# Patient Record
Sex: FEMALE | Race: WHITE | Employment: FULL TIME | URBAN - METROPOLITAN AREA
[De-identification: names, ages, dates, MRNs, and addresses within clinical notes are randomized per-mention and may not be internally consistent; named-entity substitution may affect disease eponyms.]

---

## 2024-03-18 ENCOUNTER — OFFICE VISIT (OUTPATIENT)
Dept: ENDOCRINOLOGY CLINIC | Facility: CLINIC | Age: 59
End: 2024-03-18

## 2024-03-18 ENCOUNTER — LAB ENCOUNTER (OUTPATIENT)
Dept: LAB | Facility: HOSPITAL | Age: 59
End: 2024-03-18
Attending: INTERNAL MEDICINE
Payer: COMMERCIAL

## 2024-03-18 VITALS
HEIGHT: 66 IN | HEART RATE: 64 BPM | SYSTOLIC BLOOD PRESSURE: 140 MMHG | DIASTOLIC BLOOD PRESSURE: 87 MMHG | BODY MASS INDEX: 23.95 KG/M2 | WEIGHT: 149 LBS

## 2024-03-18 DIAGNOSIS — R53.83 FATIGUE, UNSPECIFIED TYPE: ICD-10-CM

## 2024-03-18 DIAGNOSIS — R51.9 INTRACTABLE HEADACHE, UNSPECIFIED CHRONICITY PATTERN, UNSPECIFIED HEADACHE TYPE: ICD-10-CM

## 2024-03-18 DIAGNOSIS — R53.83 FATIGUE: ICD-10-CM

## 2024-03-18 DIAGNOSIS — R68.89 COLD INTOLERANCE: ICD-10-CM

## 2024-03-18 DIAGNOSIS — E07.9 DISEASE OF THYROID GLAND: Primary | ICD-10-CM

## 2024-03-18 DIAGNOSIS — R51.9 FACIAL PAIN: ICD-10-CM

## 2024-03-18 DIAGNOSIS — E07.9 THYROID DISEASE: Primary | ICD-10-CM

## 2024-03-18 DIAGNOSIS — R68.89 MECHANICAL AND MOTOR PROBLEMS WITH INTERNAL ORGANS: ICD-10-CM

## 2024-03-18 LAB
ALBUMIN SERPL-MCNC: 4.3 G/DL (ref 3.2–4.8)
ALBUMIN/GLOB SERPL: 1.5 {RATIO} (ref 1–2)
ALP LIVER SERPL-CCNC: 50 U/L
ALT SERPL-CCNC: 13 U/L
ANION GAP SERPL CALC-SCNC: 7 MMOL/L (ref 0–18)
AST SERPL-CCNC: 14 U/L (ref ?–34)
BASOPHILS # BLD AUTO: 0.03 X10(3) UL (ref 0–0.2)
BASOPHILS NFR BLD AUTO: 0.7 %
BILIRUB SERPL-MCNC: 0.4 MG/DL (ref 0.3–1.2)
BUN BLD-MCNC: 12 MG/DL (ref 9–23)
BUN/CREAT SERPL: 12.6 (ref 10–20)
CALCIUM BLD-MCNC: 9.3 MG/DL (ref 8.7–10.4)
CHLORIDE SERPL-SCNC: 111 MMOL/L (ref 98–112)
CO2 SERPL-SCNC: 27 MMOL/L (ref 21–32)
CREAT BLD-MCNC: 0.95 MG/DL
DEPRECATED RDW RBC AUTO: 43.4 FL (ref 35.1–46.3)
EGFRCR SERPLBLD CKD-EPI 2021: 70 ML/MIN/1.73M2 (ref 60–?)
EOSINOPHIL # BLD AUTO: 0.07 X10(3) UL (ref 0–0.7)
EOSINOPHIL NFR BLD AUTO: 1.7 %
ERYTHROCYTE [DISTWIDTH] IN BLOOD BY AUTOMATED COUNT: 12.8 % (ref 11–15)
FASTING STATUS PATIENT QL REPORTED: NO
FOLATE SERPL-MCNC: 17.5 NG/ML (ref 5.4–?)
GLOBULIN PLAS-MCNC: 2.8 G/DL (ref 2.8–4.4)
GLUCOSE BLD-MCNC: 76 MG/DL (ref 70–99)
HCT VFR BLD AUTO: 41 %
HGB BLD-MCNC: 12.9 G/DL
IMM GRANULOCYTES # BLD AUTO: 0.01 X10(3) UL (ref 0–1)
IMM GRANULOCYTES NFR BLD: 0.2 %
IRON SATN MFR SERPL: 23 %
IRON SERPL-MCNC: 76 UG/DL
LYMPHOCYTES # BLD AUTO: 1.35 X10(3) UL (ref 1–4)
LYMPHOCYTES NFR BLD AUTO: 32.8 %
MCH RBC QN AUTO: 29.1 PG (ref 26–34)
MCHC RBC AUTO-ENTMCNC: 31.5 G/DL (ref 31–37)
MCV RBC AUTO: 92.3 FL
MONOCYTES # BLD AUTO: 0.28 X10(3) UL (ref 0.1–1)
MONOCYTES NFR BLD AUTO: 6.8 %
NEUTROPHILS # BLD AUTO: 2.37 X10 (3) UL (ref 1.5–7.7)
NEUTROPHILS # BLD AUTO: 2.37 X10(3) UL (ref 1.5–7.7)
NEUTROPHILS NFR BLD AUTO: 57.8 %
OSMOLALITY SERPL CALC.SUM OF ELEC: 299 MOSM/KG (ref 275–295)
PLATELET # BLD AUTO: 244 10(3)UL (ref 150–450)
POTASSIUM SERPL-SCNC: 4 MMOL/L (ref 3.5–5.1)
PROT SERPL-MCNC: 7.1 G/DL (ref 5.7–8.2)
RBC # BLD AUTO: 4.44 X10(6)UL
SODIUM SERPL-SCNC: 145 MMOL/L (ref 136–145)
T4 FREE SERPL-MCNC: 1.1 NG/DL (ref 0.8–1.7)
THYROGLOB SERPL-MCNC: 23 U/ML (ref ?–60)
THYROPEROXIDASE AB SERPL-ACNC: <28 U/ML (ref ?–60)
THYROPEROXIDASE AB SERPL-ACNC: <28 U/ML (ref ?–60)
TIBC SERPL-MCNC: 331 UG/DL (ref 250–425)
TRANSFERRIN SERPL-MCNC: 222 MG/DL (ref 250–380)
TSI SER-ACNC: 0.52 MIU/ML (ref 0.55–4.78)
VIT B12 SERPL-MCNC: 497 PG/ML (ref 211–911)
VIT D+METAB SERPL-MCNC: 25.2 NG/ML (ref 30–100)
WBC # BLD AUTO: 4.1 X10(3) UL (ref 4–11)

## 2024-03-18 PROCEDURE — 82746 ASSAY OF FOLIC ACID SERUM: CPT | Performed by: INTERNAL MEDICINE

## 2024-03-18 PROCEDURE — 84443 ASSAY THYROID STIM HORMONE: CPT | Performed by: INTERNAL MEDICINE

## 2024-03-18 PROCEDURE — 86376 MICROSOMAL ANTIBODY EACH: CPT | Performed by: INTERNAL MEDICINE

## 2024-03-18 PROCEDURE — 83540 ASSAY OF IRON: CPT | Performed by: INTERNAL MEDICINE

## 2024-03-18 PROCEDURE — 85025 COMPLETE CBC W/AUTO DIFF WBC: CPT | Performed by: INTERNAL MEDICINE

## 2024-03-18 PROCEDURE — 82607 VITAMIN B-12: CPT | Performed by: INTERNAL MEDICINE

## 2024-03-18 PROCEDURE — 84466 ASSAY OF TRANSFERRIN: CPT | Performed by: INTERNAL MEDICINE

## 2024-03-18 PROCEDURE — 36415 COLL VENOUS BLD VENIPUNCTURE: CPT | Performed by: INTERNAL MEDICINE

## 2024-03-18 PROCEDURE — 82306 VITAMIN D 25 HYDROXY: CPT | Performed by: INTERNAL MEDICINE

## 2024-03-18 PROCEDURE — 80053 COMPREHEN METABOLIC PANEL: CPT | Performed by: INTERNAL MEDICINE

## 2024-03-18 PROCEDURE — 84439 ASSAY OF FREE THYROXINE: CPT | Performed by: INTERNAL MEDICINE

## 2024-03-18 NOTE — PATIENT INSTRUCTIONS
Labs today   If labs are normal , f/u in 1 year   If labs are abnormal, schedule an appt to discuss please

## 2024-03-18 NOTE — PROGRESS NOTES
New Patient Evaluation - History and Physical    CONSULT - Reason for Visit:  thyroid disease .    Self referral  Requesting Physician:   ..No primary care provider on file.    CHIEF COMPLAINT:    Chief Complaint   Patient presents with    Consult     thyroid        HISTORY OF PRESENT ILLNESS:   Gabriela Dominguez is a 58 year old female who presents with concerns she has thyroid disease     In high school, she had large thyroid and took iodine in [place]. She cannot recall if had hyperthyroid and what she had is a treatment for that.   Now has headaches, fatigue, cannot stay asleep- no difficulty falling asleep , cold intolerance, joint pain, and  burping a lot now   2 yrs ago, had diarrhea and wt loss, had EGD and C' scope for it. She was tested and neg for celiac.   Rash under eyes from food allergy she thinks  If stays away from gluten and dairy, she feels better      Denied compression symptoms: denied except the underlined ones SOB, dysphagia, odynophagia, change in voice, hoarseness, neck pain or neck mass.     No eye or vision changes.   No heat or cold intolerance  Hair and skin: no  Menstrual hx:   Neck surgery: No  Neck radiation: No   Biotin:  no   Turmeric: no           ASSESSMENT AND PLAN:      PAST MEDICAL HISTORY:   History reviewed. No pertinent past medical history.  None   PAST SURGICAL HISTORY:   History reviewed. No pertinent surgical history.  None   CURRENT MEDICATIONS:    No outpatient medications have been marked as taking for the 3/18/24 encounter (Office Visit) with Umang Sandoval MD.   None       ALLERGIES:  Not on File  None     SOCIAL HISTORY:    Social History     Socioeconomic History    Marital status:    Tobacco Use    Smoking status: Never    Smokeless tobacco: Never   Vaping Use    Vaping Use: Never used   Substance and Sexual Activity    Alcohol use: Yes     Alcohol/week: 1.0 standard drink of alcohol     Types: 1 Shots of liquor per week     Comment: weekends    Drug  use: Never       FAMILY HISTORY:   History reviewed. No pertinent family history.   Sister on thyroid   Mother with colon cancer   Father HTN , asthma     REVIEW OF SYSTEMS:  All negative other than HPI    PHYSICAL EXAM:   Height: 5' 6\" (167.6 cm) (03/18 1417)  Weight: 149 lb (67.6 kg) (03/18 1417)  BSA (Calculated - sq m): 1.77 sq meters (03/18 1417)  Pulse: 64 (03/18 1417)  BP: 140/87 (03/18 1417)  Temp: --  Do Not Use - Resp Rate: --  SpO2: --    Body mass index is 24.05 kg/m².    CONSTITUTIONAL:  Awake and alert. Age appropriate, good hygiene not in acute distress. Well-nourished and well developed. no acute distress   PSYCH:   Orientated to time, place, person & situation, Normal mood and affect, memory intact, normal insight and judgment, cooperative  Neuro: speech is clear. Awake, alert, no aphasia, no facial asymmetry, no nuchal rigidity  EYES:  No proptosis, no ptosis, conjunctiva normal  ENT:  Normocephalic, atraumatic  Eye: EOMI, normal lids, no discharge, no conjunctival erythema. No exophthalmos/proptosis, Ptosis negative   No rhinorrhea, moist oral mucosa  Neck: full range of motion  Neck/Thyroid: neck inspection: normal, No scar, No goiter   LUNGS:  No acute respiratory distress, non-labored respiration. Speaking full sentences  CARDIOVASCULAR:  regular rate   ABDOMEN:  No abdominal pain.   SKIN:  no bruising or bleeding, no rashes and no lesions, Skin is dry, no obvious rashes or lesions  EXTREMITIES: no gross abnormality   MSK: Moves extremities spontaneously. full range of motion in all major joints      DATA:     Pertinent data reviewed   Latest Reference Range & Units 02/02/21 12:18 02/16/21 11:23 03/02/21 10:50 03/16/21 12:13 03/30/21 09:45 04/13/21 10:26 04/27/21 10:37 05/11/21 09:59 05/25/21 12:01   COVID-19 ACUTE RESULT Not Detected  NOT DETECTED NOT DETECTED NOT DETECTED NOT DETECTED NOT DETECTED NOT DETECTED NOT DETECTED NOT DETECTED NOT DETECTED   viewed        No results for input(s):  \"TSH\", \"T4F\", \"T3F\", \"THYP\" in the last 72 hours.  No results found.    Orders Placed This Encounter   Procedures    Vitamin B12    Folic Acid Serum (Folate)    Vitamin D, 25-Hydroxy [Q]    TSH and Free T4    Thyroid Peroxidase (TPO) AB    THYROGLOBULIN ANTIBODY    Iron and TIBC    CBC With Differential With Platelet    Comp Metabolic Panel (14)     Orders Placed This Encounter    Vitamin B12    Folic Acid Serum (Folate)     Order Specific Question:   Release to patient     Answer:   Immediate    Vitamin D, 25-Hydroxy [Q]     Order Specific Question:   Release to patient     Answer:   Immediate    TSH and Free T4     Order Specific Question:   Release to patient     Answer:   Immediate    Thyroid Peroxidase (TPO) AB     Order Specific Question:   Release to patient     Answer:   Immediate    THYROGLOBULIN ANTIBODY     Order Specific Question:   Release to patient     Answer:   Immediate    Iron and TIBC     Order Specific Question:   Release to patient     Answer:   Immediate    CBC With Differential With Platelet     Order Specific Question:   Release to patient     Answer:   Immediate    Comp Metabolic Panel (14)     Order Specific Question:   Release to patient     Answer:   Immediate          This is a specialized patient consultation in endocrinology and required comprehensive review of prior records, as well as current evaluation, with time required for consideration of complex endocrine issues and consultation. For this visit, I personally interviewed the patient, and family member if accompanied, performed the pertinent parts of the history and physical examination. ROS included screening for appropriate endocrine conditions.   Today's diagnosis and plan were reviewed in detail with the patient who states understanding and agrees with plan. I discussed with the patient possible diagnosis, differential diagnosis, need for work up, treatment options, alternatives and side effects.     Please see note for  details about time spent which includes:   · pre-visit preparation  · reviewing records  · face to face time with the patient   · timely documentation of the encounter  · ordering medications/tests  · communication with care team  · care coordination    I appreciate the opportunity to be part of your patient's medical care and will keep you, as the referring and primary physicians, informed about the care of your patient. Please feel free to contact me should you have any questions.      Umang Sandoval MD

## 2024-03-22 ENCOUNTER — TELEPHONE (OUTPATIENT)
Facility: CLINIC | Age: 59
End: 2024-03-22

## 2024-03-22 NOTE — TELEPHONE ENCOUNTER
Patient was requesting a [facility] lab order to be added to her lab work, after confirming  with Dr. Pastrana she stated it we don't this type of test. Patient can reach us for additional questions.